# Patient Record
Sex: MALE | Race: BLACK OR AFRICAN AMERICAN | NOT HISPANIC OR LATINO | Employment: UNEMPLOYED | URBAN - METROPOLITAN AREA
[De-identification: names, ages, dates, MRNs, and addresses within clinical notes are randomized per-mention and may not be internally consistent; named-entity substitution may affect disease eponyms.]

---

## 2017-05-27 ENCOUNTER — HOSPITAL ENCOUNTER (EMERGENCY)
Facility: HOSPITAL | Age: 52
Discharge: HOME | End: 2017-05-27
Attending: EMERGENCY MEDICINE
Payer: OTHER GOVERNMENT

## 2017-05-27 VITALS
SYSTOLIC BLOOD PRESSURE: 139 MMHG | DIASTOLIC BLOOD PRESSURE: 95 MMHG | HEART RATE: 106 BPM | RESPIRATION RATE: 20 BRPM | TEMPERATURE: 98 F | OXYGEN SATURATION: 97 %
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
## 2017-05-27 DIAGNOSIS — S00.03XA SCALP CONTUSION: ICD-10-CM

## 2017-05-27 DIAGNOSIS — S16.1XXA CERVICAL STRAIN, INITIAL ENCOUNTER: ICD-10-CM

## 2017-05-27 PROCEDURE — 63600175 PHARM REV CODE 636 W HCPCS: Performed by: EMERGENCY MEDICINE

## 2017-05-27 PROCEDURE — 96372 THER/PROPH/DIAG INJ SC/IM: CPT

## 2017-05-27 PROCEDURE — 99284 EMERGENCY DEPT VISIT MOD MDM: CPT | Mod: 25

## 2017-05-27 RX ORDER — IBUPROFEN 800 MG/1
800 TABLET ORAL EVERY 6 HOURS PRN
Qty: 20 TABLET | Refills: 0 | Status: SHIPPED | OUTPATIENT
Start: 2017-05-27

## 2017-05-27 RX ORDER — GLIPIZIDE 5 MG/1
5 TABLET, FILM COATED, EXTENDED RELEASE ORAL
COMMUNITY

## 2017-05-27 RX ORDER — HYDROMORPHONE HYDROCHLORIDE 1 MG/ML
1 INJECTION, SOLUTION INTRAMUSCULAR; INTRAVENOUS; SUBCUTANEOUS
Status: COMPLETED | OUTPATIENT
Start: 2017-05-27 | End: 2017-05-27

## 2017-05-27 RX ORDER — CYCLOBENZAPRINE HCL 10 MG
10 TABLET ORAL 3 TIMES DAILY PRN
Qty: 15 TABLET | Refills: 0 | Status: SHIPPED | OUTPATIENT
Start: 2017-05-27 | End: 2017-06-01

## 2017-05-27 RX ADMIN — HYDROMORPHONE HYDROCHLORIDE 1 MG: 1 INJECTION, SOLUTION INTRAMUSCULAR; INTRAVENOUS; SUBCUTANEOUS at 04:05

## 2017-05-27 NOTE — ED NOTES
AAOx4, skin warm/dry, respirations even/unlabored.  Complains of neck and left-sided head pain.  Abrasion noted to left temporal region.  C-collar in place per [employer] first-responders.
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
